# Patient Record
Sex: MALE | Race: WHITE | NOT HISPANIC OR LATINO | Employment: UNEMPLOYED | URBAN - METROPOLITAN AREA
[De-identification: names, ages, dates, MRNs, and addresses within clinical notes are randomized per-mention and may not be internally consistent; named-entity substitution may affect disease eponyms.]

---

## 2017-07-24 ENCOUNTER — OFFICE VISIT (OUTPATIENT)
Dept: URGENT CARE | Facility: URGENT CARE | Age: 47
End: 2017-07-24
Payer: COMMERCIAL

## 2017-07-24 VITALS
SYSTOLIC BLOOD PRESSURE: 119 MMHG | BODY MASS INDEX: 24.66 KG/M2 | HEIGHT: 65 IN | OXYGEN SATURATION: 96 % | TEMPERATURE: 98.4 F | HEART RATE: 87 BPM | DIASTOLIC BLOOD PRESSURE: 71 MMHG | WEIGHT: 148 LBS

## 2017-07-24 DIAGNOSIS — M20.011 MALLET FINGER OF RIGHT HAND: Primary | ICD-10-CM

## 2017-07-24 PROCEDURE — 99213 OFFICE O/P EST LOW 20 MIN: CPT | Performed by: FAMILY MEDICINE

## 2017-07-24 ASSESSMENT — PAIN SCALES - GENERAL: PAINLEVEL: MODERATE PAIN (5)

## 2017-07-24 NOTE — MR AVS SNAPSHOT
After Visit Summary   7/24/2017    Frederic Parrish    MRN: [id number]           Patient Information     Date Of Birth          1970        Visit Information        Provider Department      7/24/2017 11:45 AM Ravindra Turner MD [facility]        Today's Diagnoses     Mallet finger of right hand    -  1       Follow-ups after your visit        Additional Services     ORTHOPEDICS ADULT REFERRAL       Your provider has referred you to: [facility]: [facility] [phone]    [email or web address]    Please be aware that coverage of these services is subject to the terms and limitations of your health insurance plan.  Call member services at your health plan with any benefit or coverage questions.      Please bring the following to your appointment:    >>   Any x-rays, CTs or MRIs which have been performed.  Contact the facility where they were done to arrange for  prior to your scheduled appointment.    >>   List of current medications   >>   This referral request   >>   Any documents/labs given to you for this referral                  Who to contact     If you have questions or need follow up information about today's clinic visit or your schedule please contact [facility] directly at [phone].  Normal or non-critical lab and imaging results will be communicated to you by MyChart, letter or phone within 4 business days after the clinic has received the results. If you do not hear from us within 7 days, please contact the clinic through MyChart or phone. If you have a critical or abnormal lab result, we will notify you by phone as soon as possible.  Submit refill requests through [employer] or call your pharmacy and they will forward the refill request to us. Please allow 3 business days for your refill to be completed.          Additional Information About Your Visit        MyChart  "Information     [employer] lets you send messages to your doctor, view your test results, renew your prescriptions, schedule appointments and more. To sign up, go to [email or web address] . Click on \"Log in\" on the left side of the screen, which will take you to the Welcome page. Then click on \"Sign up Now\" on the right side of the page.     You will be asked to enter the access code listed below, as well as some personal information. Please follow the directions to create your username and password.     Your access code is: [email or web address]  Expires: 10/22/2017 12:26 PM     Your access code will  in 90 days. If you need help or a new code, please call your [place] clinic or [phone].        Care EveryWhere ID     This is your [facility] EveryWhere ID. This could be used by other organizations to access your [place] medical records  [id number]        Your Vitals Were     Pulse Temperature Height Pulse Oximetry BMI (Body Mass Index)       87 98.4  F (36.9  C) (Oral) 5' 5\" (1.651 m) 96% 24.63 kg/m2        Blood Pressure from Last 3 Encounters:   17 119/71    Weight from Last 3 Encounters:   17 148 lb (67.1 kg)              We Performed the Following     ORTHOPEDICS ADULT REFERRAL        Primary Care Provider    None Specified       No primary provider on file.        Equal Access to Services     [facility] HospitalHARINDER : Hadii mahamed ku haddavido Sodivine, waaxda luqadaha, qaybta kaalmada dean, aman morales . So [facility] [phone].    ATENCIÓN: Si habla español, tiene a dobbs disposición servicios gratuitos de asistencia lingüística. Kaname al [phone].    We comply with applicable federal civil rights laws and [place] laws. We do not discriminate on the basis of race, color, national origin, age, disability sex, sexual orientation or gender identity.            Thank you!     [facility] you for [facility]  for your care. Our goal is always to provide you with " excellent care. Hearing back from our patients is one way we can continue to improve our services. Please take a few minutes to complete the written survey that you may receive in the mail after your visit with us. Thank you!             Your Updated Medication List - Protect others around you: Learn how to safely use, store and throw away your medicines at www.disposemymeds.org.      Notice  As of 7/24/2017 12:27 PM    You have not been prescribed any medications.

## 2017-07-24 NOTE — NURSING NOTE
"Chief Complaint   Patient presents with     Trauma     right pinky       Initial /71  Pulse 87  Temp 98.4  F (36.9  C) (Oral)  Ht 5' 5\" (1.651 m)  Wt 148 lb (67.1 kg)  SpO2 96%  BMI 24.63 kg/m2 Estimated body mass index is 24.63 kg/(m^2) as calculated from the following:    Height as of this encounter: 5' 5\" (1.651 m).    Weight as of this encounter: 148 lb (67.1 kg).  Medication Reconciliation: complete   Sita RECINOS        "

## 2017-07-24 NOTE — PROGRESS NOTES
"Some of this note was populated by a medical assistant.      SUBJECTIVE:                                                    Frederic Parrish is a 47 year old male who presents to clinic today for the following health issues:    Musculoskeletal problem/pain      Duration: x 1 month  Fall directly onto right fifth digit.     Chiropractor on Friday for his back and xray of his finger was ordered at that time and told to come to urgent care.   Description  Location: right pinky.     Intensity:  5/10    Accompanying signs and symptoms: numbness, tingling and swelling    History  Previous similar problem: YES  Previous evaluation:  x-ray, patient has disk with him today    Precipitating or alleviating factors:  Trauma or overuse: YES  Aggravating factors include: overuse    Therapies tried and outcome: ice and buddy taped        Right shoulder slap lesion repair in 2014 as was a  at one time.   Currently attending school for IT.           Problem list and histories reviewed & adjusted, as indicated.  Additional history: as documented    There is no problem list on file for this patient.    No past surgical history on file.    Social History   Substance Use Topics     Smoking status: Current Every Day Smoker     Smokeless tobacco: Never Used     Alcohol use No     No family history on file.      No current outpatient prescriptions on file.     No Known Allergies      Reviewed and updated as needed this visit by clinical staffTobacco  Allergies  Meds  Soc Hx      Reviewed and updated as needed this visit by Provider         ROS:  Constitutional, HEENT, cardiovascular, pulmonary, gi and gu systems are negative, except as otherwise noted.      OBJECTIVE:   /71  Pulse 87  Temp 98.4  F (36.9  C) (Oral)  Ht 5' 5\" (1.651 m)  Wt 148 lb (67.1 kg)  SpO2 96%  BMI 24.63 kg/m2  Body mass index is 24.63 kg/(m^2).  GENERAL: healthy, alert and no distress  NECK: no adenopathy, no asymmetry, masses, or scars " and thyroid normal to palpation  RESP: lungs clear to auscultation - no rales, rhonchi or wheezes  CV: regular rate and rhythm, normal S1 S2, no S3 or S4, no murmur, click or rub, no peripheral edema and peripheral pulses strong  ABDOMEN: soft, nontender, no hepatosplenomegaly, no masses and bowel sounds normal  MS: no gross musculoskeletal defects noted, focal distal fingertip noted to be consistent with mallet finger of  Right 5th digit.    Noted swelling at DIP with tenderness to palpation. Limited extension noted.     Diagnostic Test Results:  none     ASSESSMENT/PLAN:       ICD-10-CM    1. Mallet finger of right hand M20.011 ORTHOPEDICS ADULT REFERRAL      PLAN  Splint given alumifoam distal splint for protection. Stax splint provided although advised not to wear if painful.   Made need to consider surgical repair.   Xray reviewed and appears consistent with dorsal distal finger tip avulsion of extensor tendon. Full extension not possible 1 month out from injury as it appears there is a bone fragment interposed in the DIP joint.   Ortho referral provided.   Pain medicine declined by patient.     Ravindra Turner MD  [facility]

## 2018-09-21 ENCOUNTER — RADIANT APPOINTMENT (OUTPATIENT)
Dept: GENERAL RADIOLOGY | Facility: CLINIC | Age: 48
End: 2018-09-21
Attending: PHYSICIAN ASSISTANT
Payer: COMMERCIAL

## 2018-09-21 ENCOUNTER — OFFICE VISIT (OUTPATIENT)
Dept: URGENT CARE | Facility: URGENT CARE | Age: 48
End: 2018-09-21
Payer: COMMERCIAL

## 2018-09-21 VITALS
SYSTOLIC BLOOD PRESSURE: 112 MMHG | HEART RATE: 69 BPM | RESPIRATION RATE: 16 BRPM | TEMPERATURE: 98.5 F | OXYGEN SATURATION: 95 % | DIASTOLIC BLOOD PRESSURE: 68 MMHG

## 2018-09-21 DIAGNOSIS — S89.92XA LEFT LEG INJURY, INITIAL ENCOUNTER: ICD-10-CM

## 2018-09-21 DIAGNOSIS — S82.832A CLOSED FRACTURE OF PROXIMAL END OF LEFT FIBULA, UNSPECIFIED FRACTURE MORPHOLOGY, INITIAL ENCOUNTER: Primary | ICD-10-CM

## 2018-09-21 PROCEDURE — 99213 OFFICE O/P EST LOW 20 MIN: CPT | Performed by: PHYSICIAN ASSISTANT

## 2018-09-21 PROCEDURE — 73590 X-RAY EXAM OF LOWER LEG: CPT | Mod: LT

## 2018-09-21 RX ORDER — HYDROCODONE BITARTRATE AND ACETAMINOPHEN 5; 325 MG/1; MG/1
1 TABLET ORAL EVERY 4 HOURS PRN
Qty: 12 TABLET | Refills: 0 | Status: SHIPPED | OUTPATIENT
Start: 2018-09-21

## 2018-09-21 ASSESSMENT — PAIN SCALES - GENERAL: PAINLEVEL: SEVERE PAIN (7)

## 2018-09-21 NOTE — PROGRESS NOTES
S: 48-year-old male presents for evaluation of left knee to the ankle injury.  Slipped on the steps today as the leaves were wet.  Was unable to bear weight.  No prior left leg ankle injuries.  No numbness or tingling.  No fever.    History reviewed. No pertinent past medical history.  Not diabetic.  History   Smoking Status     Current Every Day Smoker   Smokeless Tobacco     Never Used       ROS:  GEN no fevers  SKIN no erythema  Musculoskeletal:  See HPI.      OBJECTIVE:  Blood pressure 112/68, pulse 69, temperature 98.5  F (36.9  C), temperature source Oral, resp. rate 16, SpO2 95 %.  Patient is alert and NAD.  EYES: conjunctiva clear  Ankle Exam (left):  Inspection: Mild swelling left upper fibula.    Palpation: Tender left upper fibula to lower fibula.  Nontender base of fifth metatarsal.  Achilles tendon nontender with no palpable step-off.No tenderness over medial or lateral malleolus.    Cap refill intact.    Good doralis pedis.  Neurovascularly Intact Distally.     Study Result   XR TIBIA & FIBULA LT 2 VW 9/21/2018 5:51 PM     HISTORY: Injury.      COMPARISON: None.         IMPRESSION: Lateral view demonstrates a minimally displaced fracture  of the left proximal fibular diaphysis.      FARHAT CHOWDARY MD         ASSESSMENT:    ICD-10-CM    1. Closed fracture of proximal end of left fibula, unspecified fracture morphology, initial encounter S82.832A order for DME     order for DME     ORTHO  REFERRAL     HYDROcodone-acetaminophen (NORCO) 5-325 MG per tablet   2. Left leg injury, initial encounter S89.92XA XR Tibia & Fibula Left 2 Views     ORTHO  REFERRAL     HYDROcodone-acetaminophen (NORCO) 5-325 MG per tablet         PLAN: Nonweightbearing with knee immobilizer and crutches.  Ice elevation and ibuprofen.  See Orth O early next week.      Natasha Singh PA-C

## 2018-09-21 NOTE — MR AVS SNAPSHOT
After Visit Summary   9/21/2018    Frederic Parrish    MRN: [id number]           Patient Information     Date Of Birth          1970        Visit Information        Provider Department      9/21/2018 3:45 PM Natasha Singh PA-C [facility]        Today's Diagnoses     Closed fracture of proximal end of left fibula, unspecified fracture morphology, initial encounter    -  1    Left leg injury, initial encounter           Follow-ups after your visit        Additional Services     ORTHO  REFERRAL       [facility] is referring you to the Orthopedic  Services at [facility].       The  Representative will assist you in the coordination of your Orthopedic and Musculoskeletal Care as prescribed by your physician.    The  Representative will call you within 1 business day to help schedule your appointment, or you may contact the  Representative at:    All areas ~ [phone]     Type of Referral : Non Surgical / Sport Medicine       Timeframe requested: 3 - 5 days    Coverage of these services is subject to the terms and limitations of your health insurance plan.  Please call member services at your health plan with any benefit or coverage questions.      If X-rays, CT or MRI's have been performed, please contact the facility where they were done to arrange for , prior to your scheduled appointment.  Please bring this referral request to your appointment and present it to your specialist.                  Who to contact     If you have questions or need follow up information about today's clinic visit or your schedule please contact [facility] directly at [phone].  Normal or non-critical lab and imaging results will be communicated to you by MyChart, letter or phone within 4 business days after the clinic has received the results. If you do not hear from us  "within 7 days, please contact the clinic through [employer] or phone. If you have a critical or abnormal lab result, we will notify you by phone as soon as possible.  Submit refill requests through [employer] or call your pharmacy and they will forward the refill request to us. Please allow 3 business days for your refill to be completed.          Additional Information About Your Visit        [employer] Information     [employer] lets you send messages to your doctor, view your test results, renew your prescriptions, schedule appointments and more. To sign up, go to [email or web address] . Click on \"Log in\" on the left side of the screen, which will take you to the Welcome page. Then click on \"Sign up Now\" on the right side of the page.     You will be asked to enter the access code listed below, as well as some personal information. Please follow the directions to create your username and password.     Your access code is: [email or web address]  Expires: 2018  6:16 PM     Your access code will  in 90 days. If you need help or a new code, please call your [place] clinic or [phone].        Care EveryWhere ID     This is your Care EveryWhere ID. This could be used by other organizations to access your [place] medical records  [id number]        Your Vitals Were     Pulse Temperature Respirations Pulse Oximetry          69 98.5  F (36.9  C) (Oral) 16 95%         Blood Pressure from Last 3 Encounters:   18 112/68   17 119/71    Weight from Last 3 Encounters:   17 148 lb (67.1 kg)              We Performed the Following     ORTHO  REFERRAL          Today's Medication Changes          These changes are accurate as of 18  6:16 PM.  If you have any questions, ask your nurse or doctor.               Start taking these medicines.        Dose/Directions    HYDROcodone-acetaminophen 5-325 MG per tablet   Commonly known as:  NORCO   Used for:  Closed fracture of proximal end of left fibula, " unspecified fracture morphology, initial encounter, Left leg injury, initial encounter   Started by:  Natasha Singh PA-C        Dose:  1 tablet   Take 1 tablet by mouth every 4 hours as needed for pain   Quantity:  12 tablet   Refills:  0       order for DME   Used for:  Closed fracture of proximal end of left fibula, unspecified fracture morphology, initial encounter   Started by:  Natasha Singh PA-C        Dose:  2 Device   2 Devices by Device route once for 1 dose Crutches - use as instructed   Quantity:  2 Device   Refills:  0       order for DME   Used for:  Closed fracture of proximal end of left fibula, unspecified fracture morphology, initial encounter   Started by:  Natasha Singh PA-C        Dose:  1 Device   1 Device by Device route once for 1 dose Knee immobilizer - use as instructed   Quantity:  1 Device   Refills:  0            Where to get your medicines      Some of these will need a paper prescription and others can be bought over the counter.  Ask your nurse if you have questions.     Bring a paper prescription for each of these medications     HYDROcodone-acetaminophen 5-325 MG per tablet       You don't need a prescription for these medications     order for DME    order for DME               Information about OPIOIDS     PRESCRIPTION OPIOIDS: WHAT YOU NEED TO KNOW   We gave you an opioid (narcotic) pain medicine. It is important to manage your pain, but opioids are not always the best choice. You should first try all the other options your care team gave you. Take this medicine for as short a time (and as few doses) as possible.    Some activities can increase your pain, such as bandage changes or therapy sessions. It may help to take your pain medicine 30 to 60 minutes before these activities. Reduce your stress by getting enough sleep, working on hobbies you enjoy and practicing relaxation or meditation. Talk to your care team about ways to manage your pain beyond prescription  opioids.    These medicines have risks:    DO NOT drive when on new or higher doses of pain medicine. These medicines can affect your alertness and reaction times, and you could be arrested for driving under the influence (DUI). If you need to use opioids long-term, talk to your care team about driving.    DO NOT operate heavy machinery    DO NOT do any other dangerous activities while taking these medicines.    DO NOT drink any alcohol while taking these medicines.     If the opioid prescribed includes acetaminophen, DO NOT take with any other medicines that contain acetaminophen. Read all labels carefully. Look for the word  acetaminophen  or  Tylenol.  Ask your pharmacist if you have questions or are unsure.    You can get addicted to pain medicines, especially if you have a history of addiction (chemical, alcohol or substance dependence). Talk to your care team about ways to reduce this risk.    All opioids tend to cause constipation. Drink plenty of water and eat foods that have a lot of fiber, such as fruits, vegetables, prune juice, apple juice and high-fiber cereal. Take a laxative (Miralax, milk of magnesia, Colace, Senna) if you don t move your bowels at least every other day. Other side effects include upset stomach, sleepiness, dizziness, throwing up, tolerance (needing more of the medicine to have the same effect), physical dependence and slowed breathing.    Store your pills in a secure place, locked if possible. We will not replace any lost or stolen medicine. If you don t finish your medicine, please throw away (dispose) as directed by your pharmacist. The [place] [employer]ution [employer] has more information about safe disposal: [email or web address]-[email or web address]         Primary Care Provider Fax #    Physician No Ref-Primary [phone]       No address on file        Equal Access to Services     IZZY REYES AH: roxanne Brink  raimundomahamed hoskinsaljoel rascongonzalez nelliin hayaan adeeg kharash la'aan ah. So [facility] [phone].    ATENCIÓN: Si adam bah, tiene a dobbs disposición servicios gratuitos de asistencia lingüística. Chance al [phone].    We comply with applicable federal civil rights laws and [place] laws. We do not discriminate on the basis of race, color, national origin, age, disability, sex, sexual orientation, or gender identity.            Thank you!     Thank you for choosing [facility]  for your care. Our goal is always to provide you with excellent care. Hearing back from our patients is one way we can continue to improve our services. Please take a few minutes to complete the written survey that you may receive in the mail after your visit with us. Thank you!             Your Updated Medication List - Protect others around you: Learn how to safely use, store and throw away your medicines at www.disposemymeds.org.          This list is accurate as of 9/21/18  6:16 PM.  Always use your most recent med list.                   Brand Name Dispense Instructions for use Diagnosis    HYDROcodone-acetaminophen 5-325 MG per tablet    NORCO    12 tablet    Take 1 tablet by mouth every 4 hours as needed for pain    Closed fracture of proximal end of left fibula, unspecified fracture morphology, initial encounter, Left leg injury, initial encounter       order for DME     2 Device    2 Devices by Device route once for 1 dose Crutches - use as instructed    Closed fracture of proximal end of left fibula, unspecified fracture morphology, initial encounter       order for DME     1 Device    1 Device by Device route once for 1 dose Knee immobilizer - use as instructed    Closed fracture of proximal end of left fibula, unspecified fracture morphology, initial encounter

## 2018-09-24 ENCOUNTER — RADIANT APPOINTMENT (OUTPATIENT)
Dept: GENERAL RADIOLOGY | Facility: CLINIC | Age: 48
End: 2018-09-24
Attending: PEDIATRICS
Payer: COMMERCIAL

## 2018-09-24 ENCOUNTER — OFFICE VISIT (OUTPATIENT)
Dept: ORTHOPEDICS | Facility: CLINIC | Age: 48
End: 2018-09-24
Payer: COMMERCIAL

## 2018-09-24 VITALS — DIASTOLIC BLOOD PRESSURE: 61 MMHG | SYSTOLIC BLOOD PRESSURE: 121 MMHG

## 2018-09-24 DIAGNOSIS — M25.572 LEFT ANKLE PAIN: ICD-10-CM

## 2018-09-24 DIAGNOSIS — S82.832A OTHER CLOSED FRACTURE OF PROXIMAL END OF LEFT FIBULA, INITIAL ENCOUNTER: Primary | ICD-10-CM

## 2018-09-24 DIAGNOSIS — M25.572 ACUTE LEFT ANKLE PAIN: ICD-10-CM

## 2018-09-24 PROCEDURE — 73610 X-RAY EXAM OF ANKLE: CPT | Mod: LT

## 2018-09-24 PROCEDURE — 27780 TREATMENT OF FIBULA FRACTURE: CPT | Performed by: PEDIATRICS

## 2018-09-24 PROCEDURE — 99203 OFFICE O/P NEW LOW 30 MIN: CPT | Mod: 57 | Performed by: PEDIATRICS

## 2018-09-24 NOTE — MR AVS SNAPSHOT
After Visit Summary   9/24/2018    Frederic Parrish    MRN: [id number]           Patient Information     Date Of Birth          1970        Visit Information        Provider Department      9/24/2018 12:20 PM Isabel Mccoy MD [facility]        Today's Diagnoses     Acute left ankle pain    -  1    Other closed fracture of proximal end of left fibula, initial encounter          Care Instructions        Plan:  - Today's Plan of Care:  Continue with Rest, Ice, Compression, and Elevation.  Apply ice 10-15 minutes every 2-3 hours  Continue with knee Immobilizer and Crutches  Weight Bear as tolerated, anticipate 1-2 weeks to wean off crutches and brace    Follow Up: 1 month and 1-2 weeks with repeat tib/fib x ray    If you have any further questions for your physician or physician s care team you can call [phone] and use option 3 to leave a voice message. Calls received during business hours will be returned same day.              Follow-ups after your visit        Who to contact     If you have questions or need follow up information about today's clinic visit or your schedule please contact [facility] [place] directly at [phone].  Normal or non-critical lab and imaging results will be communicated to you by Sounderhart, letter or phone within 4 business days after the clinic has received the results. If you do not hear from us within 7 days, please contact the clinic through Sounderhart or phone. If you have a critical or abnormal lab result, we will notify you by phone as soon as possible.  Submit refill requests through "[employer] or call your pharmacy and they will forward the refill request to us. Please allow 3 business days for your refill to be completed.          Additional Information About Your Visit        MyChart Information     "[employer] lets you send messages to your doctor, view your test results, renew your prescriptions,  "schedule appointments and more. To sign up, go to [email or web address] . Click on \"Log in\" on the left side of the screen, which will take you to the Welcome page. Then click on \"Sign up Now\" on the right side of the page.     You will be asked to enter the access code listed below, as well as some personal information. Please follow the directions to create your username and password.     Your access code is: [email or web address]  Expires: 2018  6:16 PM     Your access code will  in 90 days. If you need help or a new code, please call your [place] clinic or [phone].        Care EveryWhere ID     This is your Care EveryWhere ID. This could be used by other organizations to access your [place] medical records  [id number]         Blood Pressure from Last 3 Encounters:   18 121/61   18 112/68   17 119/71    Weight from Last 3 Encounters:   17 148 lb (67.1 kg)               Primary Care Provider Fax #    Physician No Ref-Primary [phone]       No address on file        Equal Access to Services     [facility] : Hadii mahamed viramonteso Sodivine, waaxda luqadaha, qaybta kaalmada adedamianyada, aman morales . So [facility] [phone].    ATENCIÓN: Si habla español, tiene a dobbs disposición servicios gratuitos de asistencia lingüística. Chance al [phone].    We comply with applicable federal civil rights laws and [place] laws. We do not discriminate on the basis of race, color, national origin, age, disability, sex, sexual orientation, or gender identity.            Thank you!     Thank you for choosing [facility]  for your care. Our goal is always to provide you with excellent care. Hearing back from our patients is one way we can continue to improve our services. Please take a few minutes to complete the written survey that you may receive in the mail after your visit with us. Thank you!             Your Updated Medication " List - Protect others around you: Learn how to safely use, store and throw away your medicines at www.disposemymeds.org.          This list is accurate as of 9/24/18  1:19 PM.  Always use your most recent med list.                   Brand Name Dispense Instructions for use Diagnosis    HYDROcodone-acetaminophen 5-325 MG per tablet    NORCO    12 tablet    Take 1 tablet by mouth every 4 hours as needed for pain    Closed fracture of proximal end of left fibula, unspecified fracture morphology, initial encounter, Left leg injury, initial encounter

## 2018-09-24 NOTE — LETTER
9/24/2018         RE: Frederic Parrish  [address] [place] [place] [address]        Dear Colleague,    Thank you for referring your patient, Frederic Parrish, to the [facility] [place]. Please see a copy of my visit note below.    Sports Medicine Clinic Visit    PCP: No Ref-Primary, Physician    Frederic Parrish is a 48 year old male who is seen  as an ER referral presenting with left lower leg pain    Injury: He reports on 9/21/18 he slipped on stairs at his house. Her reports his left knee hit 2 steps on the way down.  Went to urgent care and he presents with crutches and a knee immobilizer.  Unknown ankle injury, mild soreness without swelling.  Does hurt with weight bearing.    Location of Pain: left lower leg  Duration of Pain: 3 day(s)  Rating of Pain at worst: 9/10  Rating of Pain Currently: 2/10  Symptoms are better with: Ice, Ibuprofen, Other medications: norco, Rest and Elevation  Symptoms are worse with: any weight bearing  Additional Features:   Positive: swelling, bruising and weakness, stiffness   Negative: popping, grinding, catching, locking, instability, paresthesias and numbness  Other evaluation and/or treatments so far consists of: Ice, Ibuprofen, Other medications: norco, Rest and Elevation  Prior History of related problems: nothing    Social History: student, unemployed    Review of Systems  Skin: no bruising, no swelling  Musculoskeletal: as above  Neurologic: no numbness, paresthesias  Remainder of review of systems is negative including constitutional, CV, pulmonary, GI, except as noted in HPI or medical history.    Patient's current problem list, past medical and surgical history, and family history were reviewed.    There is no problem list on file for this patient.    No past medical history on file.  No past surgical history on file.  No family history on file.      Objective  /61 (BP Location: Left arm, Patient Position: Chair,  Cuff Size: Adult Regular)    GENERAL APPEARANCE: healthy, alert and no distress   GAIT: antalgic  SKIN: no suspicious lesions or rashes  HEENT: Sclera clear, anicteric  CV: good peripheral pulses  RESP: Breathing not labored  NEURO: Normal strength and tone, mentation intact and speech normal  PSYCH:  mentation appears normal and affect normal/bright    Bilateral Knee exam  Inspection:      no effusion, swelling of bruising bilateral    Patella:      Normal patellar tracking noted through range of motion bilateral    Tender:      Left proximal fibular tenderness distal to knee    Non Tender:      remainder of knee area left including proximal tibia, medial and lateral joint line, patella    Knee ROM:      Full active and passive ROM with flexion and extension bilateral    Special Tests:     neg (-) Edgardo left       neg (-) Lachmans left       neg (-) anterior drawer left       neg (-) posterior drawer left       neg (-) varus at 0 deg and 30 deg left       neg (-) valgus at 0 deg and 30 deg left    Gait:      antalgic    Neurovascular:      2+ peripheral pulses bilaterally and brisk capillary refill       sensation grossly intact    Bilateral Foot and Ankle Exam:    Inspection:       no visible ecchymosis       no visible edema or effusion    Foot inspection:       no deformity noted    Tender:       none    Non-Tender:       lateral malleolus  left       lateral ankle ligaments left       deltoid ligament left       medial malleolus left       anterior joint line left    ROM:        Full active and passive ROM, ankle dorsiflexion, plantarflexion, inversion, eversion, great toe dorsiflexion, remainder of toes, midfoot and subtalar bilateral  - pain in proximal fibula with ROM of left ankle    Strength:       ankle dorsiflexion 5/5 bilateral       plantarflexion 5/5 bilateral       inversion 5/5 bilateral       eversion 5/5 bilateral    Neurovascular:       2+ peripheral pulses bilaterally and brisk capillary  refill       sensation grossly intact      Radiology  I ordered, visualized and reviewed these images with the patient  Xr Ankle Left G/e 3 Views  Result Date: 9/24/2018  XR ANKLE LT G/E 3 VW 9/24/2018 12:58 PM HISTORY: ; Left ankle pain   IMPRESSION: Negative exam. STACIE ESCAMILLA MD    I ordered, visualized and reviewed these images with the patient  Xr Tibia & Fibula Left 2 Views  Result Date: 9/21/2018  XR TIBIA & FIBULA LT 2 VW 9/21/2018 5:51 PM HISTORY: Injury. COMPARISON: None.   IMPRESSION: Lateral view demonstrates a minimally displaced fracture of the left proximal fibular diaphysis. FARHAT CHOWDARY MD    Assessment:  1. Other closed fracture of proximal end of left fibula, initial encounter    2. Acute left ankle pain      Proximal left fibula fracture.  Low suspicion for other knee injury.  Ankle exam unremarkable.  Discussed typical treatment including supportive care over first 1-2 weeks.  Continue immobilizer and crutches - can weight bear as tolerated (Anticipate 1-2 weeks).  Follow up in 1-2 weeks with repeat x-rays.  Will continue to monitor knee exam.     Plan:  - Today's Plan of Care:  Continue with Rest, Ice, Compression, and Elevation.  Apply ice 10-15 minutes every 2-3 hours  Continue with knee Immobilizer and Crutches  Weight Bear as tolerated, anticipate 1-2 weeks to wean off crutches and brace    Follow Up: 1-2 weeks with repeat tib/fib x ray    Concerning signs and symptoms were reviewed.  The patient expressed understanding of this management plan and all questions were answered at this time.    Isabel Mccoy MD [facility]  Primary Care Sports Medicine  [facility]      Again, thank you for allowing me to participate in the care of your patient.        Sincerely,        Isabel Mccoy MD

## 2018-09-24 NOTE — PROGRESS NOTES
Sports Medicine Clinic Visit    PCP: No Ref-Primary, Physician    Frederic Parrish is a 48 year old male who is seen  as an ER referral presenting with left lower leg pain    Injury: He reports on 9/21/18 he slipped on stairs at his house. Her reports his left knee hit 2 steps on the way down.  Went to urgent care and he presents with crutches and a knee immobilizer.  Unknown ankle injury, mild soreness without swelling.  Does hurt with weight bearing.    Location of Pain: left lower leg  Duration of Pain: 3 day(s)  Rating of Pain at worst: 9/10  Rating of Pain Currently: 2/10  Symptoms are better with: Ice, Ibuprofen, Other medications: norco, Rest and Elevation  Symptoms are worse with: any weight bearing  Additional Features:   Positive: swelling, bruising and weakness, stiffness   Negative: popping, grinding, catching, locking, instability, paresthesias and numbness  Other evaluation and/or treatments so far consists of: Ice, Ibuprofen, Other medications: norco, Rest and Elevation  Prior History of related problems: nothing    Social History: student, unemployed    Review of Systems  Skin: no bruising, no swelling  Musculoskeletal: as above  Neurologic: no numbness, paresthesias  Remainder of review of systems is negative including constitutional, CV, pulmonary, GI, except as noted in HPI or medical history.    Patient's current problem list, past medical and surgical history, and family history were reviewed.    There is no problem list on file for this patient.    No past medical history on file.  No past surgical history on file.  No family history on file.      Objective  /61 (BP Location: Left arm, Patient Position: Chair, Cuff Size: Adult Regular)    GENERAL APPEARANCE: healthy, alert and no distress   GAIT: antalgic  SKIN: no suspicious lesions or rashes  HEENT: Sclera clear, anicteric  CV: good peripheral pulses  RESP: Breathing not labored  NEURO: Normal strength and tone, mentation intact  and speech normal  PSYCH:  mentation appears normal and affect normal/bright    Bilateral Knee exam  Inspection:      no effusion, swelling of bruising bilateral    Patella:      Normal patellar tracking noted through range of motion bilateral    Tender:      Left proximal fibular tenderness distal to knee    Non Tender:      remainder of knee area left including proximal tibia, medial and lateral joint line, patella    Knee ROM:      Full active and passive ROM with flexion and extension bilateral    Special Tests:     neg (-) Edgardo left       neg (-) Lachmans left       neg (-) anterior drawer left       neg (-) posterior drawer left       neg (-) varus at 0 deg and 30 deg left       neg (-) valgus at 0 deg and 30 deg left    Gait:      antalgic    Neurovascular:      2+ peripheral pulses bilaterally and brisk capillary refill       sensation grossly intact    Bilateral Foot and Ankle Exam:    Inspection:       no visible ecchymosis       no visible edema or effusion    Foot inspection:       no deformity noted    Tender:       none    Non-Tender:       lateral malleolus  left       lateral ankle ligaments left       deltoid ligament left       medial malleolus left       anterior joint line left    ROM:        Full active and passive ROM, ankle dorsiflexion, plantarflexion, inversion, eversion, great toe dorsiflexion, remainder of toes, midfoot and subtalar bilateral  - pain in proximal fibula with ROM of left ankle    Strength:       ankle dorsiflexion 5/5 bilateral       plantarflexion 5/5 bilateral       inversion 5/5 bilateral       eversion 5/5 bilateral    Neurovascular:       2+ peripheral pulses bilaterally and brisk capillary refill       sensation grossly intact      Radiology  I ordered, visualized and reviewed these images with the patient  Xr Ankle Left G/e 3 Views  Result Date: 9/24/2018  XR ANKLE LT G/E 3 VW 9/24/2018 12:58 PM HISTORY: ; Left ankle pain   IMPRESSION: Negative exam. STACIE  MD ANDI    I ordered, visualized and reviewed these images with the patient  Xr Tibia & Fibula Left 2 Views  Result Date: 9/21/2018  XR TIBIA & FIBULA LT 2 VW 9/21/2018 5:51 PM HISTORY: Injury. COMPARISON: None.   IMPRESSION: Lateral view demonstrates a minimally displaced fracture of the left proximal fibular diaphysis. FARHAT CHOWDARY MD    Assessment:  1. Other closed fracture of proximal end of left fibula, initial encounter    2. Acute left ankle pain      Proximal left fibula fracture.  Low suspicion for other knee injury.  Ankle exam unremarkable.  Discussed typical treatment including supportive care over first 1-2 weeks.  Continue immobilizer and crutches - can weight bear as tolerated (Anticipate 1-2 weeks).  Follow up in 1-2 weeks with repeat x-rays.  Will continue to monitor knee exam.     Plan:  - Today's Plan of Care:  Continue with Rest, Ice, Compression, and Elevation.  Apply ice 10-15 minutes every 2-3 hours  Continue with knee Immobilizer and Crutches  Weight Bear as tolerated, anticipate 1-2 weeks to wean off crutches and brace    Follow Up: 1-2 weeks with repeat tib/fib x ray    Concerning signs and symptoms were reviewed.  The patient expressed understanding of this management plan and all questions were answered at this time.    Isabel Mccoy MD [facility]  Primary Care Sports Medicine  [facility]

## 2018-10-04 ENCOUNTER — RADIANT APPOINTMENT (OUTPATIENT)
Dept: GENERAL RADIOLOGY | Facility: CLINIC | Age: 48
End: 2018-10-04
Attending: PEDIATRICS
Payer: COMMERCIAL

## 2018-10-04 ENCOUNTER — OFFICE VISIT (OUTPATIENT)
Dept: ORTHOPEDICS | Facility: CLINIC | Age: 48
End: 2018-10-04
Payer: COMMERCIAL

## 2018-10-04 VITALS — DIASTOLIC BLOOD PRESSURE: 76 MMHG | BODY MASS INDEX: 24.13 KG/M2 | WEIGHT: 145 LBS | SYSTOLIC BLOOD PRESSURE: 127 MMHG

## 2018-10-04 DIAGNOSIS — S82.832D OTHER CLOSED FRACTURE OF PROXIMAL END OF LEFT FIBULA WITH ROUTINE HEALING, SUBSEQUENT ENCOUNTER: ICD-10-CM

## 2018-10-04 DIAGNOSIS — S82.832D OTHER CLOSED FRACTURE OF PROXIMAL END OF LEFT FIBULA WITH ROUTINE HEALING, SUBSEQUENT ENCOUNTER: Primary | ICD-10-CM

## 2018-10-04 PROCEDURE — 73560 X-RAY EXAM OF KNEE 1 OR 2: CPT | Mod: LT

## 2018-10-04 PROCEDURE — 99207 ZZC FRACTURE CARE IN GLOBAL PERIOD: CPT | Performed by: PEDIATRICS

## 2018-10-04 NOTE — MR AVS SNAPSHOT
"              After Visit Summary   10/4/2018    Frederic Parrish    MRN: [id number]           Patient Information     Date Of Birth          1970        Visit Information        Provider Department      10/4/2018 10:00 AM Isabel Mccoy MD [facility] [place]        Today's Diagnoses     Closed fracture of upper end of left fibula    -  1    Other closed fracture of proximal end of left fibula with routine healing, subsequent encounter          Care Instructions        Plan:  - Today's Plan of Care:  Continue protected weight bearing with cane  Weight bear as tolerated    Follow Up: 1 month with left knee x ray    If you have any further questions for your physician or physician s care team you can call [phone] and use option 3 to leave a voice message. Calls received during business hours will be returned same day.              Follow-ups after your visit        Who to contact     If you have questions or need follow up information about today's clinic visit or your schedule please contact [place] [facility] [place] directly at [phone].  Normal or non-critical lab and imaging results will be communicated to you by Learneratorhart, letter or phone within 4 business days after the clinic has received the results. If you do not hear from us within 7 days, please contact the clinic through algrano or phone. If you have a critical or abnormal lab result, we will notify you by phone as soon as possible.  Submit refill requests through [employer]ano or call your pharmacy and they will forward the refill request to us. Please allow 3 business days for your refill to be completed.          Additional Information About Your Visit        algrano Information     [employer]ano lets you send messages to your doctor, view your test results, renew your prescriptions, schedule appointments and more. To sign up, go to [email or web address] . Click on \"Log in\" on the left side of the " "screen, which will take you to the Welcome page. Then click on \"Sign up Now\" on the right side of the page.     You will be asked to enter the access code listed below, as well as some personal information. Please follow the directions to create your username and password.     Your access code is: [email or web address]  Expires: 2018  6:16 PM     Your access code will  in 90 days. If you need help or a new code, please call your [place] clinic or [phone].        Care EveryWhere ID     This is your Care EveryWhere ID. This could be used by other organizations to access your [place] medical records  [id number]        Your Vitals Were     BMI (Body Mass Index)                   24.13 kg/m2            Blood Pressure from Last 3 Encounters:   10/04/18 127/76   18 121/61   18 112/68    Weight from Last 3 Encounters:   10/04/18 145 lb (65.8 kg)   17 148 lb (67.1 kg)               Primary Care Provider Fax #    Physician No Ref-Primary [phone]       No address on file        Equal Access to Services     IZZY REYES : Hadii mahamed perez Sodivine, waaxda luscar, qaybta kaalmada dean, aman morales . So [place] -[phone].    ATENCIÓN: Si habla español, tiene a dobbs disposición servicios gratuitos de asistencia lingüística. Chance al [phone].    We comply with applicable federal civil rights laws and [place] laws. We do not discriminate on the basis of race, color, national origin, age, disability, sex, sexual orientation, or gender identity.            Thank you!     Thank you for choosing [place] [facility]  for your care. Our goal is always to provide you with excellent care. Hearing back from our patients is one way we can continue to improve our services. Please take a few minutes to complete the written survey that you may receive in the mail after your visit with us. Thank you!             Your Updated Medication List - " Protect others around you: Learn how to safely use, store and throw away your medicines at www.disposemymeds.org.          This list is accurate as of 10/4/18 10:30 AM.  Always use your most recent med list.                   Brand Name Dispense Instructions for use Diagnosis    HYDROcodone-acetaminophen 5-325 MG per tablet    NORCO    12 tablet    Take 1 tablet by mouth every 4 hours as needed for pain    Closed fracture of proximal end of left fibula, unspecified fracture morphology, initial encounter, Left leg injury, initial encounter

## 2018-10-04 NOTE — LETTER
10/4/2018         RE: Frederic Parrish  [address] [place] [address]        Dear Colleague,    Thank you for referring your patient, Frederic Parrish, to the [facility] [place]. Please see a copy of my visit note below.    Sports Medicine Clinic Visit - Interim History October 4, 2018      PCP: No Ref-Primary, Physician    Frederic Parrish is a 48 year old male who is seen in f/u up for Other closed fracture of proximal end of left fibula with routine healing, subsequent encounter. Since last visit on 9/24/18, patient has continued soreness in the lateral lower leg and calf. He has not been using the knee immobilizer for the last few days and he has been using a cane instead of crutches for weight bearing.  - Now ~ 2 weeks from initial injury    Social History: Student    Review of Systems  Skin: initial bruising, initial swelling  Musculoskeletal: as above  Neurologic: no numbness, paresthesias  Remainder of review of systems is negative including constitutional, CV, pulmonary, GI, except as noted in HPI or medical history.    Patient's current problem list, past medical and surgical history, and family history were reviewed.    There is no problem list on file for this patient.    No past medical history on file.  No past surgical history on file.  No family history on file.    Objective  /76 (BP Location: Left arm, Patient Position: Chair, Cuff Size: Adult Regular)  Wt 145 lb (65.8 kg)  BMI 24.13 kg/m2    GENERAL APPEARANCE: healthy, alert and no distress   GAIT: antalgic and cane  SKIN: no suspicious lesions or rashes  HEENT: Sclera clear, anicteric  CV: good peripheral pulses  RESP: Breathing not labored  NEURO: Normal strength and tone, mentation intact and speech normal  PSYCH:  mentation appears normal and affect normal/bright    Bilateral Knee exam  Inspection:      no effusion, swelling of bruising bilateral     Patella:      Normal patellar  tracking noted through range of motion bilateral     Tender:      Left proximal fibular tenderness distal to knee     Non Tender:      remainder of knee area left including proximal tibia, medial and lateral joint line, patella     Knee ROM:      Full active and passive ROM with flexion and extension bilateral     Special Tests:     neg (-) Edgardo left       neg (-) Lachmans left       neg (-) anterior drawer left       neg (-) posterior drawer left       neg (-) varus at 0 deg and 30 deg left       neg (-) valgus at 0 deg and 30 deg left     Gait:       Slightly antalgic     Neurovascular:      2+ peripheral pulses bilaterally and brisk capillary refill       sensation grossly intact     Radiology  I ordered, visualized and reviewed these images with the patient  2 XR views of left knee reviewed: stable alignment of proximal fibula fracture  - will follow official read    Assessment:  1. Other closed fracture of proximal end of left fibula with routine healing, subsequent encounter      Healing, stable fracture.  Continue WBAT, use cane as needed.  Would rest from impact and heavy activity.    Plan:  - Today's Plan of Care:  Continue protected weight bearing with cane  Weight bear as tolerated    Follow Up: 1 month with left knee x ray 2 views    Concerning signs and symptoms were reviewed.  The patient expressed understanding of this management plan and all questions were answered at this time.    Isabel Mccoy MD [facility]  Primary Care Sports Medicine  [place] [facility]    Again, thank you for allowing me to participate in the care of your patient.        Sincerely,        Isabel Mccoy MD

## 2018-10-04 NOTE — PATIENT INSTRUCTIONS
Plan:  - Today's Plan of Care:  Continue protected weight bearing with cane  Weight bear as tolerated    Follow Up: 1 month with left knee x ray    If you have any further questions for your physician or physician s care team you can call [phone] and use option 3 to leave a voice message. Calls received during business hours will be returned same day.

## 2018-10-04 NOTE — PROGRESS NOTES
Sports Medicine Clinic Visit - Interim History October 4, 2018      PCP: No Ref-Primary, Physician    Frederic Parrish is a 48 year old male who is seen in f/u up for Other closed fracture of proximal end of left fibula with routine healing, subsequent encounter. Since last visit on 9/24/18, patient has continued soreness in the lateral lower leg and calf. He has not been using the knee immobilizer for the last few days and he has been using a cane instead of crutches for weight bearing.  - Now ~ 2 weeks from initial injury    Social History: Student    Review of Systems  Skin: initial bruising, initial swelling  Musculoskeletal: as above  Neurologic: no numbness, paresthesias  Remainder of review of systems is negative including constitutional, CV, pulmonary, GI, except as noted in HPI or medical history.    Patient's current problem list, past medical and surgical history, and family history were reviewed.    There is no problem list on file for this patient.    No past medical history on file.  No past surgical history on file.  No family history on file.    Objective  /76 (BP Location: Left arm, Patient Position: Chair, Cuff Size: Adult Regular)  Wt 145 lb (65.8 kg)  BMI 24.13 kg/m2    GENERAL APPEARANCE: healthy, alert and no distress   GAIT: antalgic and cane  SKIN: no suspicious lesions or rashes  HEENT: Sclera clear, anicteric  CV: good peripheral pulses  RESP: Breathing not labored  NEURO: Normal strength and tone, mentation intact and speech normal  PSYCH:  mentation appears normal and affect normal/bright    Bilateral Knee exam  Inspection:      no effusion, swelling of bruising bilateral     Patella:      Normal patellar tracking noted through range of motion bilateral     Tender:      Left proximal fibular tenderness distal to knee     Non Tender:      remainder of knee area left including proximal tibia, medial and lateral joint line, patella     Knee ROM:      Full active and passive ROM  with flexion and extension bilateral     Special Tests:     neg (-) Edgardo left       neg (-) Lachmans left       neg (-) anterior drawer left       neg (-) posterior drawer left       neg (-) varus at 0 deg and 30 deg left       neg (-) valgus at 0 deg and 30 deg left     Gait:      Slightly antalgic     Neurovascular:      2+ peripheral pulses bilaterally and brisk capillary refill       sensation grossly intact     Radiology  I ordered, visualized and reviewed these images with the patient  2 XR views of left knee reviewed: stable alignment of proximal fibula fracture  - will follow official read    Assessment:  1. Other closed fracture of proximal end of left fibula with routine healing, subsequent encounter      Healing, stable fracture.  Continue WBAT, use cane as needed.  Would rest from impact and heavy activity.    Plan:  - Today's Plan of Care:  Continue protected weight bearing with cane  Weight bear as tolerated    Follow Up: 1 month with left knee x ray 2 views    Concerning signs and symptoms were reviewed.  The patient expressed understanding of this management plan and all questions were answered at this time.    Isabel Mccoy MD CAQ  Primary Care Sports Medicine  [facility]

## 2018-10-29 ENCOUNTER — RADIANT APPOINTMENT (OUTPATIENT)
Dept: GENERAL RADIOLOGY | Facility: CLINIC | Age: 48
End: 2018-10-29
Attending: PEDIATRICS
Payer: COMMERCIAL

## 2018-10-29 ENCOUNTER — OFFICE VISIT (OUTPATIENT)
Dept: ORTHOPEDICS | Facility: CLINIC | Age: 48
End: 2018-10-29
Payer: COMMERCIAL

## 2018-10-29 VITALS
HEIGHT: 65 IN | DIASTOLIC BLOOD PRESSURE: 86 MMHG | WEIGHT: 146 LBS | BODY MASS INDEX: 24.32 KG/M2 | SYSTOLIC BLOOD PRESSURE: 124 MMHG

## 2018-10-29 DIAGNOSIS — S82.832D OTHER CLOSED FRACTURE OF PROXIMAL END OF LEFT FIBULA WITH ROUTINE HEALING, SUBSEQUENT ENCOUNTER: ICD-10-CM

## 2018-10-29 DIAGNOSIS — S99.912D INJURY OF LEFT ANKLE, SUBSEQUENT ENCOUNTER: ICD-10-CM

## 2018-10-29 DIAGNOSIS — S82.832D OTHER CLOSED FRACTURE OF PROXIMAL END OF LEFT FIBULA WITH ROUTINE HEALING, SUBSEQUENT ENCOUNTER: Primary | ICD-10-CM

## 2018-10-29 PROCEDURE — 73600 X-RAY EXAM OF ANKLE: CPT | Mod: LT

## 2018-10-29 PROCEDURE — 73590 X-RAY EXAM OF LOWER LEG: CPT | Mod: LT

## 2018-10-29 PROCEDURE — 99207 ZZC FRACTURE CARE IN GLOBAL PERIOD: CPT | Performed by: PEDIATRICS

## 2018-10-29 NOTE — PROGRESS NOTES
"Sports Medicine Clinic Visit - Interim History October 29, 2018      PCP: No Ref-Primary, Physician    Frederic Parrish is a 48 year old male who is seen in f/u up for Other closed fracture of proximal end of left fibula with routine healing, subsequent encounter. Since last visit on 10/4/18 patient has reports of pain in his calf, ankle and knee. He reports he is currently wearing boots for ankle support. He currently walks with an altered gait due to the pain. He uses a cane at times for assistance with with weight bearing.  Overall improving thought, ankle pain is more latera.  - Now ~ 5 weeks from initial injury    Social History: Student    Review of Systems  Skin: no bruising, no swelling  Musculoskeletal: as above  Neurologic: no numbness, paresthesias  Remainder of review of systems is negative including constitutional, CV, pulmonary, GI, except as noted in HPI or medical history.    Patient's current problem list, past medical and surgical history, and family history were reviewed.    There is no problem list on file for this patient.    No past medical history on file.  No past surgical history on file.  No family history on file.    Objective  /86 (BP Location: Left arm, Patient Position: Chair, Cuff Size: Adult Regular)  Ht 5' 5\" (1.651 m)  Wt 146 lb (66.2 kg)  BMI 24.3 kg/m2    GENERAL APPEARANCE: healthy, alert and no distress   GAIT: slightly antalgic  SKIN: no suspicious lesions or rashes  HEENT: Sclera clear, anicteric  CV: good peripheral pulses  RESP: Breathing not labored  NEURO: Normal strength and tone, mentation intact and speech normal  PSYCH:  mentation appears normal and affect normal/bright    Bilateral Knee exam  Inspection:      no effusion, swelling of bruising bilateral     Patella:      Normal patellar tracking noted through range of motion bilateral     Tender:      Left proximal fibular tenderness distal to knee     Non Tender:      remainder of knee area left " including proximal tibia, medial and lateral joint line, patella     Knee ROM:      Full active and passive ROM with flexion and extension bilateral     Special Tests:     neg (-) Edgardo left       neg (-) Lachmans left       neg (-) anterior drawer left       neg (-) posterior drawer left       neg (-) varus at 0 deg and 30 deg left       neg (-) valgus at 0 deg and 30 deg left     Gait:      slightly antalgic     Neurovascular:      2+ peripheral pulses bilaterally and brisk capillary refill       sensation grossly intact     Bilateral Foot and Ankle Exam:  Inspection:       no visible ecchymosis       no visible edema or effusion     Foot inspection:       no deformity noted     Tender:      mild lateral ankle ligaments     Non-Tender:       lateral malleolus  left       deltoid ligament left       medial malleolus left       anterior joint line left     ROM:        Full active and passive ROM, ankle dorsiflexion, plantarflexion, inversion, eversion, great toe dorsiflexion, remainder of toes, midfoot and subtalar bilateral     Strength:       ankle dorsiflexion 5/5 bilateral       plantarflexion 5/5 bilateral       inversion 5/5 bilateral       eversion 5/5 bilateral     Neurovascular:       2+ peripheral pulses bilaterally and brisk capillary refill       sensation grossly intact    Radiology  I ordered, visualized and reviewed these images with the patient  2 XR views of left tib fib and ankle mortise view reviewed: healing proximal fibula, stable mortise  - will follow official read    Assessment:  1. Other closed fracture of proximal end of left fibula with routine healing, subsequent encounter    2. Injury of left ankle, subsequent encounter      Healing fracture, overall reassuring knee and ankle exam.  Pain likely from overuse and altered gait.  I recommend start PT, consider ankle brace for support.  Would consider advanced imaging pending clinical course.    Plan:  - Today's Plan of Care:  Rehab:  Physical Therapy: [facility] - [phone]  Medical Equipment: Ankle brace    -We also discussed other future treatment options:  MRI of the ankle or knee    Follow Up: 3 weeks    Concerning signs and symptoms were reviewed.  The patient expressed understanding of this management plan and all questions were answered at this time.    Isabel Mccoy MD CAQ  Primary Care Sports Medicine  [facility]

## 2018-10-29 NOTE — PATIENT INSTRUCTIONS
Plan:  - Today's Plan of Care:  Rehab: Physical Therapy: [facility] - [phone]  Medical Equipment: Ankle brace    -We also discussed other future treatment options:  MRI of the ankle or knee    Follow Up: 3 weeks    If you have any further questions for your physician or physician s care team you can call [phone] and use option 3 to leave a voice message. Calls received during business hours will be returned same day.

## 2018-10-29 NOTE — MR AVS SNAPSHOT
After Visit Summary   10/29/2018    Frederic Parrish    MRN: [id number]           Patient Information     Date Of Birth          1970        Visit Information        Provider Department      10/29/2018 9:40 AM Isabel Mccoy MD [facility]        Today's Diagnoses     Other closed fracture of proximal end of left fibula with routine healing, subsequent encounter    -  1    Injury of left ankle, subsequent encounter          Care Instructions        Plan:  - Today's Plan of Care:  Rehab: Physical Therapy: [facility] - [phone]  Medical Equipment: Ankle brace    -We also discussed other future treatment options:  MRI of the ankle or knee    Follow Up: 3 weeks    If you have any further questions for your physician or physician s care team you can call [phone] and use option 3 to leave a voice message. Calls received during business hours will be returned same day.              Follow-ups after your visit        Additional Services     [facility] PT, HAND, AND CHIROPRACTIC REFERRAL       Physical Therapy, Hand Therapy and Chiropractic Care are available through:  *[facility]  *Hand Therapy (Occupational Therapy or Physical Therapy)  *[facility]    Call one number to schedule at any of the above locations: [phone].    Physical therapy, Hand therapy and/or Chiropractic care has been recommended by your physician as an excellent treatment option to reduce pain and help people return to normal activities, including sports.  Therapy and/or chiropractic care services are a great complement or alternative to expensive and invasive surgery, injections, or long-term use of prescription medications. The primary goal is to identify the underlying problem and provide you the tools to manage your condition on your own.     Please be aware that coverage of these services is subject to the terms and  "limitations of your health insurance plan.  Call member services at your health plan with any benefit or coverage questions.      Please bring the following to your appointment:  *Your personal calendar for scheduling future appointments  *Comfortable clothing                  Future tests that were ordered for you today     Open Future Orders        Priority Expected Expires Ordered    [facility] PT, HAND, AND CHIROPRACTIC REFERRAL Routine  10/29/2019 10/29/2018            Who to contact     If you have questions or need follow up information about today's clinic visit or your schedule please contact [facility] directly at [phone].  Normal or non-critical lab and imaging results will be communicated to you by [employer], letter or phone within 4 business days after the clinic has received the results. If you do not hear from us within 7 days, please contact the clinic through [employer] or phone. If you have a critical or abnormal lab result, we will notify you by phone as soon as possible.  Submit refill requests through [employer] or call your pharmacy and they will forward the refill request to us. Please allow 3 business days for your refill to be completed.          Additional Information About Your Visit        [employer] Information     [employer] lets you send messages to your doctor, view your test results, renew your prescriptions, schedule appointments and more. To sign up, go to [email or web address] . Click on \"Log in\" on the left side of the screen, which will take you to the Welcome page. Then click on \"Sign up Now\" on the right side of the page.     You will be asked to enter the access code listed below, as well as some personal information. Please follow the directions to create your username and password.     Your access code is: [email or web address]  Expires: 2018  6:16 PM     Your access code will  in 90 days. If you need help or a new code, please call your [facility] clinic " "or [phone].        Care EveryWhere ID     This is your Care EveryWhere ID. This could be used by other organizations to access your [facility] medical records  [id number]        Your Vitals Were     Height BMI (Body Mass Index)                5' 5\" (1.651 m) 24.3 kg/m2           Blood Pressure from Last 3 Encounters:   10/29/18 124/86   10/04/18 127/76   09/24/18 121/61    Weight from Last 3 Encounters:   10/29/18 146 lb (66.2 kg)   10/04/18 145 lb (65.8 kg)   07/24/17 148 lb (67.1 kg)               Primary Care Provider Fax #    Physician No Ref-Primary [phone]       No address on file        Equal Access to Services     IZZY REYES : Manda perez Sodivine, waaxda luqadaha, qaybta kaalmada adeegyada, aman morales . [facility] [phone].    ATENCIÓN: Si habla español, tiene a dobbs disposición servicios gratuitos de asistencia lingüística. Llame al [phone].    We comply with applicable federal civil rights laws and [place] laws. We do not discriminate on the basis of race, color, national origin, age, disability, sex, sexual orientation, or gender identity.            Thank you!     Thank you for choosing [facility]  for your care. Our goal is always to provide you with excellent care. Hearing back from our patients is one way we can continue to improve our services. Please take a few minutes to complete the written survey that you may receive in the mail after your visit with us. Thank you!             Your Updated Medication List - Protect others around you: Learn how to safely use, store and throw away your medicines at www.disposemymeds.org.          This list is accurate as of 10/29/18 10:30 AM.  Always use your most recent med list.                   Brand Name Dispense Instructions for use Diagnosis    HYDROcodone-acetaminophen 5-325 MG per tablet    NORCO    12 tablet    Take 1 tablet by mouth every 4 hours as needed for pain "    Closed fracture of proximal end of left fibula, unspecified fracture morphology, initial encounter, Left leg injury, initial encounter

## 2018-10-29 NOTE — LETTER
"    10/29/2018         RE: Frederic Parrish  [address] [place] [place] [address]        Dear Colleague,    Thank you for referring your patient, Frederic Parrish, to the [facility]. Please see a copy of my visit note below.    Sports Medicine Clinic Visit - Interim History October 29, 2018      PCP: No Ref-Primary, Physician    Frederic Parrish is a 48 year old male who is seen in f/u up for Other closed fracture of proximal end of left fibula with routine healing, subsequent encounter. Since last visit on 10/4/18 patient has reports of pain in his calf, ankle and knee. He reports he is currently wearing boots for ankle support. He currently walks with an altered gait due to the pain. He uses a cane at times for assistance with with weight bearing.  Overall improving thought, ankle pain is more latera.  - Now ~ 5 weeks from initial injury    Social History: Student    Review of Systems  Skin: no bruising, no swelling  Musculoskeletal: as above  Neurologic: no numbness, paresthesias  Remainder of review of systems is negative including constitutional, CV, pulmonary, GI, except as noted in HPI or medical history.    Patient's current problem list, past medical and surgical history, and family history were reviewed.    There is no problem list on file for this patient.    No past medical history on file.  No past surgical history on file.  No family history on file.    Objective  /86 (BP Location: Left arm, Patient Position: Chair, Cuff Size: Adult Regular)  Ht 5' 5\" (1.651 m)  Wt 146 lb (66.2 kg)  BMI 24.3 kg/m2    GENERAL APPEARANCE: healthy, alert and no distress   GAIT: slightly antalgic  SKIN: no suspicious lesions or rashes  HEENT: Sclera clear, anicteric  CV: good peripheral pulses  RESP: Breathing not labored  NEURO: Normal strength and tone, mentation intact and speech normal  PSYCH:  mentation appears normal and affect normal/bright    Bilateral " Knee exam  Inspection:      no effusion, swelling of bruising bilateral     Patella:      Normal patellar tracking noted through range of motion bilateral     Tender:      Left proximal fibular tenderness distal to knee     Non Tender:      remainder of knee area left including proximal tibia, medial and lateral joint line, patella     Knee ROM:      Full active and passive ROM with flexion and extension bilateral     Special Tests:     neg (-) Edgardo left       neg (-) Lachmans left       neg (-) anterior drawer left       neg (-) posterior drawer left       neg (-) varus at 0 deg and 30 deg left       neg (-) valgus at 0 deg and 30 deg left     Gait:       slightly antalgic     Neurovascular:      2+ peripheral pulses bilaterally and brisk capillary refill       sensation grossly intact     Bilateral Foot and Ankle Exam:  Inspection:       no visible ecchymosis       no visible edema or effusion     Foot inspection:       no deformity noted     Tender:       mild lateral ankle ligaments     Non-Tender:       lateral malleolus  left       deltoid ligament left       medial malleolus left       anterior joint line left     ROM:        Full active and passive ROM, ankle dorsiflexion, plantarflexion, inversion, eversion, great toe dorsiflexion, remainder of toes, midfoot and subtalar bilateral     Strength:       ankle dorsiflexion 5/5 bilateral       plantarflexion 5/5 bilateral       inversion 5/5 bilateral       eversion 5/5 bilateral     Neurovascular:       2+ peripheral pulses bilaterally and brisk capillary refill       sensation grossly intact    Radiology  I ordered, visualized and reviewed these images with the patient  2 XR views of left tib fib and ankle mortise view reviewed: healing proximal fibula, stable mortise  - will follow official read    Assessment:  1. Other closed fracture of proximal end of left fibula with routine healing, subsequent encounter    2. Injury of left ankle, subsequent  encounter      Healing fracture, overall reassuring knee and ankle exam.  Pain likely from overuse and altered gait.  I recommend start PT, consider ankle brace for support.  Would consider advanced imaging pending clinical course.    Plan:  - Today's Plan of Care:  Rehab: Physical Therapy: [facility] - [phone]  Medical Equipment: Ankle brace    -We also discussed other future treatment options:  MRI of the ankle or knee    Follow Up: 3 weeks    Concerning signs and symptoms were reviewed.  The patient expressed understanding of this management plan and all questions were answered at this time.    Isabel Mccoy MD CAQ  Primary Care Sports Medicine  [facility]    Again, thank you for allowing me to participate in the care of your patient.        Sincerely,        Isabel Mccoy MD

## 2018-11-15 ENCOUNTER — OFFICE VISIT (OUTPATIENT)
Dept: ORTHOPEDICS | Facility: CLINIC | Age: 48
End: 2018-11-15
Payer: COMMERCIAL

## 2018-11-15 ENCOUNTER — RADIANT APPOINTMENT (OUTPATIENT)
Dept: GENERAL RADIOLOGY | Facility: CLINIC | Age: 48
End: 2018-11-15
Attending: PEDIATRICS
Payer: COMMERCIAL

## 2018-11-15 VITALS
SYSTOLIC BLOOD PRESSURE: 130 MMHG | DIASTOLIC BLOOD PRESSURE: 79 MMHG | BODY MASS INDEX: 24.32 KG/M2 | WEIGHT: 146 LBS | HEIGHT: 65 IN

## 2018-11-15 DIAGNOSIS — S82.832D OTHER CLOSED FRACTURE OF PROXIMAL END OF LEFT FIBULA WITH ROUTINE HEALING, SUBSEQUENT ENCOUNTER: ICD-10-CM

## 2018-11-15 DIAGNOSIS — S99.912D INJURY OF LEFT ANKLE, SUBSEQUENT ENCOUNTER: ICD-10-CM

## 2018-11-15 DIAGNOSIS — S82.832D OTHER CLOSED FRACTURE OF PROXIMAL END OF LEFT FIBULA WITH ROUTINE HEALING, SUBSEQUENT ENCOUNTER: Primary | ICD-10-CM

## 2018-11-15 PROCEDURE — 73590 X-RAY EXAM OF LOWER LEG: CPT | Mod: LT

## 2018-11-15 PROCEDURE — 99207 ZZC FRACTURE CARE IN GLOBAL PERIOD: CPT | Performed by: PEDIATRICS

## 2018-11-15 NOTE — LETTER
"    11/15/2018         RE: Frederic Parrish  [address] [place] [address]        Dear Colleague,    Thank you for referring your patient, Frederic Parrish, to the [facility]. Please see a copy of my visit note below.    Sports Medicine Clinic Visit - Interim History November 15, 2018      PCP: No Ref-Primary, Physician    Frederic Parrish is a 48 year old male who is seen in f/u up for    Other closed fracture of proximal end of left fibula with routine healing, subsequent encounter  Injury of left ankle, subsequent encounter.  Since last visit on 10/29/18, patient has improved pain over all but reports soreness in the calf and lateral leg. No ankle pain. He reports he has not received any physical therapy at this time.  - Now ~ 7 weeks from initial injury    Social History: Student    Review of Systems  Skin: no bruising, no swelling  Musculoskeletal: as above  Neurologic: no numbness, paresthesias  Remainder of review of systems is negative including constitutional, CV, pulmonary, GI, except as noted in HPI or medical history.    Patient's current problem list, past medical and surgical history, and family history were reviewed.    There is no problem list on file for this patient.    No past medical history on file.  No past surgical history on file.  No family history on file.    Objective  /79 (BP Location: Left arm, Cuff Size: Adult Regular)  Ht 5' 5\" (1.651 m)  Wt 146 lb (66.2 kg)  BMI 24.3 kg/m2    GENERAL APPEARANCE: healthy, alert and no distress   GAIT: NORMAL  SKIN: no suspicious lesions or rashes  HEENT: Sclera clear, anicteric  CV: good peripheral pulses  RESP: Breathing not labored  NEURO: Normal strength and tone, mentation intact and speech normal  PSYCH:  mentation appears normal and affect normal/bright    Bilateral leg, foot and ankle exam  Inspection:       no visible ecchymosis       no visible edema or effusion      Foot " inspection:       no deformity noted      Tender:      mild lateral calf      Non-Tender:  Remainder of ankle      ROM:        Full active and passive ROM, ankle dorsiflexion, plantarflexion, inversion, eversion, great toe dorsiflexion, remainder of toes, midfoot and subtalar bilateral      Strength:       ankle dorsiflexion 5/5 bilateral       plantarflexion 5/5 bilateral       inversion 5/5 bilateral       eversion 5/5 bilateral      Neurovascular:       2+ peripheral pulses bilaterally and brisk capillary refill       sensation grossly intact    Radiology  I ordered, visualized and reviewed these images with the patient  LEFT TIBIA AND FIBULA TWO VIEWS  11/15/2018 1:16 PM      HISTORY:  Other closed fracture of proximal end of left fibula with  routine healing, subsequent encounter.         IMPRESSION: Fibular proximal diaphyseal healing fracture with callus  formation, the alignment and appearance unchanged from 10/29/2018.       Assessment:  1. Other closed fracture of proximal end of left fibula with routine healing, subsequent encounter    2. Injury of left ankle, subsequent encounter      Healing fracture, continue supportive care and can consider PT given mild residual pain.    Plan:  - Today's Plan of Care:  Schedule physical therapy. Order provided to patient    Follow Up: 2 month with repeat x ray of left tib/fib    Concerning signs and symptoms were reviewed.  The patient expressed understanding of this management plan and all questions were answered at this time.    Isabel Mccoy MD [facility]  Primary Care Sports Medicine  [facility]    Again, thank you for allowing me to participate in the care of your patient.        Sincerely,        Isabel Mccoy MD

## 2018-11-15 NOTE — MR AVS SNAPSHOT
"              After Visit Summary   11/15/2018    Frederic Parrish    MRN: [id number]           Patient Information     Date Of Birth          1970        Visit Information        Provider Department      11/15/2018 1:00 PM Isabel Mccoy MD [facility]        Today's Diagnoses     Other closed fracture of proximal end of left fibula with routine healing, subsequent encounter    -  1    Injury of left ankle, subsequent encounter          Care Instructions        Plan:  - Today's Plan of Care:  Schedule physical therapy. Order provided to patient    Follow Up: 2 month with repeat x ray    If you have any further questions for your physician or physician s care team you can call [phone] and use option 3 to leave a voice message. Calls received during business hours will be returned same day.              Follow-ups after your visit        Who to contact     If you have questions or need follow up information about today's clinic visit or your schedule please contact [facility] directly at [phone].  Normal or non-critical lab and imaging results will be communicated to you by MyChart, letter or phone within 4 business days after the clinic has received the results. If you do not hear from us within 7 days, please contact the clinic through [employer] or phone. If you have a critical or abnormal lab result, we will notify you by phone as soon as possible.  Submit refill requests through [employer] or call your pharmacy and they will forward the refill request to us. Please allow 3 business days for your refill to be completed.          Additional Information About Your Visit        Care EveryWhere ID     This is your Care EveryWhere ID. This could be used by other organizations to access your [facility] medical records  [id number]        Your Vitals Were     Height BMI (Body Mass Index)                5' 5\" (1.651 m) 24.3 kg/m2           " Blood Pressure from Last 3 Encounters:   11/15/18 130/79   10/29/18 124/86   10/04/18 127/76    Weight from Last 3 Encounters:   11/15/18 146 lb (66.2 kg)   10/29/18 146 lb (66.2 kg)   10/04/18 145 lb (65.8 kg)               Primary Care Provider Fax #    Physician No Ref-Primary [phone]       No address on file        Equal Access to Services     IZZY REYES : Hadii aad ku hadasho Soomaali, waaxda luqadaha, qaybta kaalmada adeegyada, waxay idiin hayaan adeeg raulsh lakartik . So [facility] [phone].    ATENCIÓN: Si habla esprobert, tiene a dobbs disposición servicios gratuitos de asistencia lingüística. Llame al [phone].    We comply with applicable federal civil rights laws and [place] laws. We do not discriminate on the basis of race, color, national origin, age, disability, sex, sexual orientation, or gender identity.            Thank you!     Thank you for choosing [facility]  for your care. Our goal is always to provide you with excellent care. Hearing back from our patients is one way we can continue to improve our services. Please take a few minutes to complete the written survey that you may receive in the mail after your visit with us. Thank you!             Your Updated Medication List - Protect others around you: Learn how to safely use, store and throw away your medicines at www.disposemymeds.org.          This list is accurate as of 11/15/18  1:32 PM.  Always use your most recent med list.                   Brand Name Dispense Instructions for use Diagnosis    HYDROcodone-acetaminophen 5-325 MG per tablet    NORCO    12 tablet    Take 1 tablet by mouth every 4 hours as needed for pain    Closed fracture of proximal end of left fibula, unspecified fracture morphology, initial encounter, Left leg injury, initial encounter       order for DME     1 Device    Equipment being ordered: ankle brace trilok    Other closed fracture of proximal end of left fibula with  routine healing, subsequent encounter, Injury of left ankle, subsequent encounter

## 2018-11-15 NOTE — PROGRESS NOTES
"Sports Medicine Clinic Visit - Interim History November 15, 2018      PCP: No Ref-Primary, Physician    Frederic Parrish is a 48 year old male who is seen in f/u up for    Other closed fracture of proximal end of left fibula with routine healing, subsequent encounter  Injury of left ankle, subsequent encounter.  Since last visit on 10/29/18, patient has improved pain over all but reports soreness in the calf and lateral leg. No ankle pain. He reports he has not received any physical therapy at this time.  - Now ~ 7 weeks from initial injury    Social History: Student    Review of Systems  Skin: no bruising, no swelling  Musculoskeletal: as above  Neurologic: no numbness, paresthesias  Remainder of review of systems is negative including constitutional, CV, pulmonary, GI, except as noted in HPI or medical history.    Patient's current problem list, past medical and surgical history, and family history were reviewed.    There is no problem list on file for this patient.    No past medical history on file.  No past surgical history on file.  No family history on file.    Objective  /79 (BP Location: Left arm, Cuff Size: Adult Regular)  Ht 5' 5\" (1.651 m)  Wt 146 lb (66.2 kg)  BMI 24.3 kg/m2    GENERAL APPEARANCE: healthy, alert and no distress   GAIT: NORMAL  SKIN: no suspicious lesions or rashes  HEENT: Sclera clear, anicteric  CV: good peripheral pulses  RESP: Breathing not labored  NEURO: Normal strength and tone, mentation intact and speech normal  PSYCH:  mentation appears normal and affect normal/bright    Bilateral leg, foot and ankle exam  Inspection:       no visible ecchymosis       no visible edema or effusion      Foot inspection:       no deformity noted      Tender:      mild lateral calf      Non-Tender:  Remainder of ankle      ROM:        Full active and passive ROM, ankle dorsiflexion, plantarflexion, inversion, eversion, great toe dorsiflexion, remainder of toes, midfoot and subtalar " bilateral      Strength:       ankle dorsiflexion 5/5 bilateral       plantarflexion 5/5 bilateral       inversion 5/5 bilateral       eversion 5/5 bilateral      Neurovascular:       2+ peripheral pulses bilaterally and brisk capillary refill       sensation grossly intact    Radiology  I ordered, visualized and reviewed these images with the patient  LEFT TIBIA AND FIBULA TWO VIEWS  11/15/2018 1:16 PM      HISTORY:  Other closed fracture of proximal end of left fibula with  routine healing, subsequent encounter.         IMPRESSION: Fibular proximal diaphyseal healing fracture with callus  formation, the alignment and appearance unchanged from 10/29/2018.       Assessment:  1. Other closed fracture of proximal end of left fibula with routine healing, subsequent encounter    2. Injury of left ankle, subsequent encounter      Healing fracture, continue supportive care and can consider PT given mild residual pain.    Plan:  - Today's Plan of Care:  Schedule physical therapy. Order provided to patient    Follow Up: 2 month with repeat x ray of left tib/fib    Concerning signs and symptoms were reviewed.  The patient expressed understanding of this management plan and all questions were answered at this time.    Isabel Mccoy MD CAQ  Primary Care Sports Medicine  [facility]

## 2018-11-15 NOTE — PATIENT INSTRUCTIONS
Plan:  - Today's Plan of Care:  Schedule physical therapy. Order provided to patient    Follow Up: 2 month with repeat x ray of left tib/fib    If you have any further questions for your physician or physician s care team you can call [phone] and use option 3 to leave a voice message. Calls received during business hours will be returned same day.

## 2019-01-17 ENCOUNTER — OFFICE VISIT (OUTPATIENT)
Dept: ORTHOPEDICS | Facility: CLINIC | Age: 49
End: 2019-01-17
Payer: COMMERCIAL

## 2019-01-17 ENCOUNTER — ANCILLARY PROCEDURE (OUTPATIENT)
Dept: GENERAL RADIOLOGY | Facility: CLINIC | Age: 49
End: 2019-01-17
Payer: COMMERCIAL

## 2019-01-17 VITALS
HEIGHT: 65 IN | BODY MASS INDEX: 24.32 KG/M2 | SYSTOLIC BLOOD PRESSURE: 128 MMHG | WEIGHT: 146 LBS | DIASTOLIC BLOOD PRESSURE: 82 MMHG

## 2019-01-17 DIAGNOSIS — S82.832D OTHER CLOSED FRACTURE OF PROXIMAL END OF LEFT FIBULA WITH ROUTINE HEALING, SUBSEQUENT ENCOUNTER: Primary | ICD-10-CM

## 2019-01-17 DIAGNOSIS — S99.912D INJURY OF LEFT ANKLE, SUBSEQUENT ENCOUNTER: ICD-10-CM

## 2019-01-17 DIAGNOSIS — S82.832D OTHER CLOSED FRACTURE OF PROXIMAL END OF LEFT FIBULA WITH ROUTINE HEALING, SUBSEQUENT ENCOUNTER: ICD-10-CM

## 2019-01-17 PROCEDURE — 99213 OFFICE O/P EST LOW 20 MIN: CPT | Performed by: PEDIATRICS

## 2019-01-17 PROCEDURE — 73590 X-RAY EXAM OF LOWER LEG: CPT | Mod: LT

## 2019-01-17 ASSESSMENT — MIFFLIN-ST. JEOR: SCORE: 1459.13

## 2019-01-17 NOTE — PROGRESS NOTES
"Sports Medicine Clinic Visit - Interim History January 17, 2019      PCP: No Ref-Primary, Physician    Frederic Parrish is a 48 year old male who is seen in f/u up for    Other closed fracture of proximal end of left fibula with routine healing, subsequent encounter  Injury of left ankle, subsequent encounter.  Since last visit on 11/15/18, patient has reports of little to no pain with activity. He reports he is able to hike and has returned to his normal activities. He is no longer using the ankle brace.  Mild residual pain and weakness at times.  - Now ~ 4 months from initial injury    Social History: Student    Review of Systems  Skin: no bruising, no swelling  Musculoskeletal: as above  Neurologic: no numbness, paresthesias  Remainder of review of systems is negative including constitutional, CV, pulmonary, GI, except as noted in HPI or medical history.    Patient's current problem list, past medical and surgical history, and family history were reviewed.    There is no problem list on file for this patient.    No past medical history on file.  No past surgical history on file.  No family history on file.    Objective  /82 (BP Location: Left arm, Patient Position: Chair, Cuff Size: Adult Regular)   Ht 1.651 m (5' 5\")   Wt 66.2 kg (146 lb)   BMI 24.30 kg/m      GENERAL APPEARANCE: healthy, alert and no distress   GAIT: NORMAL  SKIN: no suspicious lesions or rashes  HEENT: Sclera clear, anicteric  CV: good peripheral pulses  RESP: Breathing not labored  NEURO: Normal strength and tone, mentation intact and speech normal  PSYCH:  mentation appears normal and affect normal/bright    Bilateral Foot and Ankle Exam:  Inspection:       no visible ecchymosis       no visible edema or effusion    Foot inspection:       no deformity noted    Tender:       No proximal fibular tenderness, no ankle tenderness    Non-Tender:       remainder of ankle and foot bilateral    ROM:        Full active and passive ROM, " ankle dorsiflexion, plantarflexion, inversion, eversion, great toe dorsiflexion, remainder of toes, midfoot and subtalar bilateral    Strength:       ankle dorsiflexion 5/5 bilateral       plantarflexion 5/5 bilateral       inversion 5/5 bilateral       eversion 5/5 bilateral    Special Tests:       neg (-) anterior drawer bilateral       neg (-) talar tilt bilateral    Gait:       normal    Neurovascular:       2+ peripheral pulses bilaterally and brisk capillary refill       sensation grossly intact    Radiology  I ordered, visualized and reviewed these images with the patient  2 XR views of left tib/fib reviewed: healing proximal fibula fracture with good callous formation and bony bridging  - will follow official read    Assessment:  1. Other closed fracture of proximal end of left fibula with routine healing, subsequent encounter    2. Injury of left ankle, subsequent encounter      Healing proximal tibia fracture.  Continue to gradually return to activities as tolerated.  Consider PT for strength.    Plan:  - Today's Plan of Care:  Physical Therapy: [facility] - [phone] if desired  Gradual return to activity    Follow Up: as needed    Concerning signs and symptoms were reviewed.  The patient expressed understanding of this management plan and all questions were answered at this time.    Isabel Mccoy MD CAQ  Primary Care Sports Medicine  [facility]

## 2019-01-17 NOTE — PATIENT INSTRUCTIONS
Plan:  - Today's Plan of Care:  Physical Therapy: [facility] - [phone] if desired  Gradual return to activity    Follow Up: as needed    If you have any further questions for your physician or physician s care team you can call [phone] and use option 3 to leave a voice message. Calls received during business hours will be returned same day.

## 2019-01-17 NOTE — LETTER
"    1/17/2019         RE: Frederic Parrish  [address] [place] [address]        Dear Colleague,    Thank you for referring your patient, Frederic Parrish, to the [facility]. Please see a copy of my visit note below.    Sports Medicine Clinic Visit - Interim History January 17, 2019      PCP: No Ref-Primary, Physician    Frederic Parrish is a 48 year old male who is seen in f/u up for    Other closed fracture of proximal end of left fibula with routine healing, subsequent encounter  Injury of left ankle, subsequent encounter.  Since last visit on 11/15/18, patient has reports of little to no pain with activity. He reports he is able to hike and has returned to his normal activities. He is no longer using the ankle brace.  Mild residual pain and weakness at times.  - Now ~ 4 months from initial injury    Social History: Student    Review of Systems  Skin: no bruising, no swelling  Musculoskeletal: as above  Neurologic: no numbness, paresthesias  Remainder of review of systems is negative including constitutional, CV, pulmonary, GI, except as noted in HPI or medical history.    Patient's current problem list, past medical and surgical history, and family history were reviewed.    There is no problem list on file for this patient.    No past medical history on file.  No past surgical history on file.  No family history on file.    Objective  /82 (BP Location: Left arm, Patient Position: Chair, Cuff Size: Adult Regular)   Ht 1.651 m (5' 5\")   Wt 66.2 kg (146 lb)   BMI 24.30 kg/m       GENERAL APPEARANCE: healthy, alert and no distress   GAIT: NORMAL  SKIN: no suspicious lesions or rashes  HEENT: Sclera clear, anicteric  CV: good peripheral pulses  RESP: Breathing not labored  NEURO: Normal strength and tone, mentation intact and speech normal  PSYCH:  mentation appears normal and affect normal/bright    Bilateral Foot and Ankle Exam:  Inspection:      "  no visible ecchymosis       no visible edema or effusion    Foot inspection:       no deformity noted    Tender:       No proximal fibular tenderness, no ankle tenderness    Non-Tender:       remainder of ankle and foot bilateral    ROM:        Full active and passive ROM, ankle dorsiflexion, plantarflexion, inversion, eversion, great toe dorsiflexion, remainder of toes, midfoot and subtalar bilateral    Strength:       ankle dorsiflexion 5/5 bilateral       plantarflexion 5/5 bilateral       inversion 5/5 bilateral       eversion 5/5 bilateral    Special Tests:       neg (-) anterior drawer bilateral       neg (-) talar tilt bilateral    Gait:       normal    Neurovascular:       2+ peripheral pulses bilaterally and brisk capillary refill       sensation grossly intact    Radiology  I ordered, visualized and reviewed these images with the patient  2 XR views of left tib/fib reviewed: healing proximal fibula fracture with good callous formation and bony bridging  - will follow official read    Assessment:  1. Other closed fracture of proximal end of left fibula with routine healing, subsequent encounter    2. Injury of left ankle, subsequent encounter      Healing proximal tibia fracture.  Continue to gradually return to activities as tolerated.  Consider PT for strength.    Plan:  - Today's Plan of Care:  Physical Therapy: [facility] - [phone] if desired  Gradual return to activity    Follow Up: as needed    Concerning signs and symptoms were reviewed.  The patient expressed understanding of this management plan and all questions were answered at this time.    Isabel Mccoy MD CAQ  Primary Care Sports Medicine  [facility]    Again, thank you for allowing me to participate in the care of your patient.        Sincerely,        Isabel Mccoy MD

## 2024-03-19 ENCOUNTER — OFFICE VISIT (OUTPATIENT)
Dept: URGENT CARE | Facility: URGENT CARE | Age: 54
End: 2024-03-19

## 2024-03-19 VITALS
OXYGEN SATURATION: 96 % | DIASTOLIC BLOOD PRESSURE: 72 MMHG | BODY MASS INDEX: 23.81 KG/M2 | HEART RATE: 70 BPM | SYSTOLIC BLOOD PRESSURE: 129 MMHG | WEIGHT: 143.1 LBS | TEMPERATURE: 98.2 F

## 2024-03-19 DIAGNOSIS — S81.011A KNEE LACERATION, RIGHT, INITIAL ENCOUNTER: Primary | ICD-10-CM

## 2024-03-19 PROCEDURE — 12001 RPR S/N/AX/GEN/TRNK 2.5CM/<: CPT | Performed by: PHYSICIAN ASSISTANT

## 2024-03-19 ASSESSMENT — ENCOUNTER SYMPTOMS
PALPITATIONS: 0
FEVER: 0
SORE THROAT: 0
WHEEZING: 0
WOUND: 1
SHORTNESS OF BREATH: 0
CARDIOVASCULAR NEGATIVE: 1
SINUS PRESSURE: 0
SINUS PAIN: 0
CHILLS: 0
FATIGUE: 0
COUGH: 0
RHINORRHEA: 0
COLOR CHANGE: 0

## 2024-03-19 NOTE — PROGRESS NOTES
Subjective   Frederic is a 53 year old, presenting for the following health issues:  Laceration (Pt was getting house ready to be sold and accidentally cut himself right above knee 11:30 am today, possible suture )    HPI   Concern - R knee laceration  Onset: today  Description:  Sustained a laceration to the R knee after an angle  cut his knee earlier today.    Intensity: moderate  Progression of Symptoms:  same  Accompanying Signs & Symptoms: No radicular pain, numbness, tingling or weakness.  No swelling, redness, drainage or fevers.  Last Tdap was 2022.  Previous history of similar problem: none  Precipitating factors:        Worsened by: none  Alleviating factors:        Improved by: none  Therapies tried and outcome: compression, hydrogen peroxide with minimal relief    There is no problem list on file for this patient.    Current Outpatient Medications   Medication    HYDROcodone-acetaminophen (NORCO) 5-325 MG per tablet    order for DME     No current facility-administered medications for this visit.      No Known Allergies    Review of Systems   Constitutional:  Negative for chills, fatigue and fever.   HENT:  Negative for congestion, rhinorrhea, sinus pressure, sinus pain and sore throat.    Respiratory:  Negative for cough, shortness of breath and wheezing.    Cardiovascular: Negative.  Negative for chest pain, palpitations and leg swelling.   Skin:  Positive for wound. Negative for color change, pallor and rash.   All other systems reviewed and are negative.          Objective    /72 (BP Location: Left arm, Patient Position: Sitting, Cuff Size: Adult Regular)   Pulse 70   Temp 98.2  F (36.8  C) (Tympanic)   Wt 64.9 kg (143 lb 1.6 oz)   SpO2 96%   BMI 23.81 kg/m    Body mass index is 23.81 kg/m .  Physical Exam  Vitals and nursing note reviewed.   Constitutional:       General: He is not in acute distress.     Appearance: Normal appearance. He is normal weight. He is not ill-appearing.    Musculoskeletal:      Right knee: Laceration (2.5cm vertical laceration present superior to the knee.  no erythema or drainage present) present. No swelling, deformity, erythema or ecchymosis. Normal range of motion. Tenderness present.      Left knee: No swelling.   Skin:     General: Skin is warm.      Capillary Refill: Capillary refill takes less than 2 seconds.   Neurological:      Mental Status: He is alert and oriented to person, place, and time.      Sensory: Sensation is intact.      Motor: Motor function is intact.      Gait: Gait is intact.      Deep Tendon Reflexes: Reflexes are normal and symmetric.   Psychiatric:         Mood and Affect: Mood normal.         Behavior: Behavior normal.         Thought Content: Thought content normal.         Judgment: Judgment normal.     Procedure:  Discussed risks and benefits of procedure and patient has decided to proceed. Laceration was irrigated with normal saline and then sterilized with betadine swabs X3.  This was then anesthetized with 3cc of 2% lidocaine with epi.  Laceration was repaired with five 4-0 ethilon sutures in a simple interrupted fashion and dressed with bacitracin, gauze and coban.  Minimal bleeding.  Patient tolerated procedure well.          Assessment/Plan:  Knee laceration, right, initial encounter: No evidence of infection at this time.  He is UTD on his Tdap.   This was closed with sutures and placed in dressings.  Keep clean and dry for the next 24hours.  Tylenol/ibuprofen as needed for pain.  RTC in 7-10days for suture removal.  RTC sooner if he develops worsening pain, numbness, redness, drainage or fevers.   -     REPAIR SUPERFICIAL, WOUND BODY < =2.5CM        Erika See SARA Velasquez